# Patient Record
Sex: MALE | Race: WHITE
[De-identification: names, ages, dates, MRNs, and addresses within clinical notes are randomized per-mention and may not be internally consistent; named-entity substitution may affect disease eponyms.]

---

## 2017-09-29 ENCOUNTER — HOSPITAL ENCOUNTER (EMERGENCY)
Dept: HOSPITAL 42 - ED | Age: 52
Discharge: HOME | End: 2017-09-29
Payer: COMMERCIAL

## 2017-09-29 VITALS
DIASTOLIC BLOOD PRESSURE: 82 MMHG | RESPIRATION RATE: 18 BRPM | HEART RATE: 75 BPM | SYSTOLIC BLOOD PRESSURE: 130 MMHG | OXYGEN SATURATION: 99 %

## 2017-09-29 VITALS — TEMPERATURE: 98.2 F

## 2017-09-29 VITALS — BODY MASS INDEX: 23.5 KG/M2

## 2017-09-29 DIAGNOSIS — Z79.4: ICD-10-CM

## 2017-09-29 DIAGNOSIS — E10.9: ICD-10-CM
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
## 2017-09-29 DIAGNOSIS — I25.10: ICD-10-CM

## 2017-09-29 DIAGNOSIS — S69.91XA: Primary | ICD-10-CM
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
## 2017-09-29 LAB
ALBUMIN/GLOB SERPL: 1.5 {RATIO} (ref 1.1–1.8)
ALP SERPL-CCNC: 69 U/L (ref 38–126)
ALT SERPL-CCNC: 57 U/L (ref 7–56)
AMYLASE SERPL-CCNC: 100 U/L (ref 35–125)
APPEARANCE UR: CLEAR
AST SERPL-CCNC: 49 U/L (ref 17–59)
BASOPHILS # BLD AUTO: 0.02 K/MM3 (ref 0–2)
BASOPHILS NFR BLD: 0.4 % (ref 0–3)
BILIRUB SERPL-MCNC: 0.8 MG/DL (ref 0.2–1.3)
BILIRUB UR-MCNC: NEGATIVE MG/DL
BUN SERPL-MCNC: 15 MG/DL (ref 7–21)
CALCIUM SERPL-MCNC: 9.4 MG/DL (ref 8.4–10.5)
CHLORIDE SERPL-SCNC: 99 MMOL/L (ref 98–107)
CO2 SERPL-SCNC: 26 MMOL/L (ref 21–33)
COLOR UR: YELLOW
EOSINOPHIL # BLD: 0.1 10*3/UL (ref 0–0.7)
EOSINOPHIL NFR BLD: 1.2 % (ref 1.5–5)
ERYTHROCYTE [DISTWIDTH] IN BLOOD BY AUTOMATED COUNT: 13.2 % (ref 11.5–14.5)
GLOBULIN SER-MCNC: 3 GM/DL
GLUCOSE SERPL-MCNC: 231 MG/DL (ref 70–110)
GLUCOSE UR STRIP-MCNC: NEGATIVE MG/DL
GRANULOCYTES # BLD: 2.56 10*3/UL (ref 1.4–6.5)
GRANULOCYTES NFR BLD: 52.3 % (ref 50–68)
HCT VFR BLD CALC: 43 % (ref 42–52)
KETONES UR STRIP-MCNC: NEGATIVE MG/DL
LEUKOCYTE ESTERASE UR-ACNC: NEGATIVE LEU/UL
LYMPHOCYTES # BLD: 1.9 10*3/UL (ref 1.2–3.4)
LYMPHOCYTES NFR BLD AUTO: 39.4 % (ref 22–35)
MCH RBC QN AUTO: 28.2 PG (ref 25–35)
MCHC RBC AUTO-ENTMCNC: 34 G/DL (ref 31–37)
MCV RBC AUTO: 83 FL (ref 80–105)
MONOCYTES # BLD AUTO: 0.3 10*3/UL (ref 0.1–0.6)
MONOCYTES NFR BLD: 6.7 % (ref 1–6)
PH UR STRIP: 6 [PH] (ref 4.7–8)
PLATELET # BLD: 142 10^3/UL (ref 120–450)
PMV BLD AUTO: 9.9 FL (ref 7–11)
POTASSIUM SERPL-SCNC: 4.3 MMOL/L (ref 3.6–5)
PROT SERPL-MCNC: 7.5 G/DL (ref 5.8–8.3)
PROT UR STRIP-MCNC: NEGATIVE MG/DL
RBC # UR STRIP: NEGATIVE /UL
SODIUM SERPL-SCNC: 135 MMOL/L (ref 132–148)
SP GR UR STRIP: 1.02 (ref 1–1.03)
UROBILINOGEN UR STRIP-ACNC: 0.2 E.U./DL
WBC # BLD AUTO: 4.9 10^3/UL (ref 4.5–11)

## 2017-09-29 NOTE — ED PDOC
Addendum entered and electronically signed by MAYDA BROTHERS DO  09/29/17 12:23

: 








Addendum


Addendum: 


09/29/17 12:22





Patient made aware of his need to follow up with employee health on Monday October 2nd. Patient verbalized understanding and is in agreement with this 

plan. Employee health notified and is following patient.





Original Note:








Arrival/HPI





- History of Present Illness


Time/Duration: 1/2 hour


Symptom Onset: Sudden


Activities at Onset: Light


Context: Work





<MAYDA BROTHERS - Last Filed: 09/29/17 12:22>





<Niko Arteaga - Last Filed: 09/29/17 15:53>





- General


Chief Complaint: Needle Stick





- History of Present Illness


Narrative History of Present Illness (Text): 


09/29/17 09:51





Mr. Donohue is a 52 year old male with a past medical history significant for IDDM 

and CAD s/p stent placement 9 years ago who presents to the [facility] ED after he was 

stuck with a needle during routine patient care. Patient is a phlebotomist at 

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 

needle that was used to draw blood. Patient reports that he saw blood coming 

from his finger after the incident. Of note, the patient from which Mr. Donohue 

was drawing blood has a history of negative HIV testing but no previous history 

of hepatitis testing at this facility per chart review. Patient denies headache

, changes in his vision, chest pain, palpitations, SOB, cough, abdominal pain, N

/V, diarrhea, burning with urination, any new rash or any numbness/tingling/

weakness of any extremity.  (MAYDA BROTHERS)





Past Medical History





- Provider Review


Nursing Documentation Reviewed: Yes





- Travel History


Have you recently traveled outside [place] w/in the past 3 mons?: No





- Past History


Past History: Non-Contributing





- Infectious Disease


Hx of Infectious Diseases: None





- Cardiac


Hx Cardiac Disorders: Yes





- Endocrine/Metabolic


Hx Diabetes Mellitus Type 1: Yes





- Psychiatric


Hx Substance Use: No





<MAYDA BROTHERS - Last Filed: 09/29/17 12:22>





Family/Social History





- Physician Review


Nursing Documentation Reviewed: Yes


Family/Social History: Unknown Family HX


Smoking Status: Never Smoked


Hx Alcohol Use: No


Hx Substance Use: No





<MAYDA BROTHERS - Last Filed: 09/29/17 12:22>





Allergies/Home Meds





<MAYDA BROTHERS - Last Filed: 09/29/17 12:22>





<Niko Arteaga - Last Filed: 09/29/17 15:53>


Allergies/Adverse Reactions: 


Allergies





No Known Allergies Allergy (Verified 09/29/17 09:39)


 








Home Medications: 


 Home Meds











 Medication  Instructions  Recorded  Confirmed


 


No Known Home Med  09/29/17 09/29/17














Review of Systems





- Physician Review


All systems were reviewed & negative as marked: Yes





- Review of Systems


Constitutional: Normal.  absent: Fevers


Eyes: Normal.  absent: Vision Changes


Respiratory: Normal.  absent: SOB, Cough, Wheezing


Cardiovascular: Normal.  absent: Chest Pain, Palpitations


Gastrointestinal: Normal.  absent: Abdominal Pain, Diarrhea, Nausea, Vomiting


Genitourinary Male: Normal.  absent: Dysuria


Musculoskeletal: Normal


Skin: Normal.  absent: Rash, Skin Lesions, Laceration


Neurological: Normal.  absent: Headache, Dizziness





<MAYDA BROTHERS - Last Filed: 09/29/17 12:22>





Physical Exam


Vital Signs Reviewed: Yes


Temperature: Afebrile


Blood Pressure: Normal


Pulse: Regular


Respiratory Rate: Normal


Appearance: Positive for: Well-Appearing, Non-Toxic, Comfortable


Pain Distress: None


Mental Status: Positive for: Alert and Oriented X 3





- Systems Exam


Head: Present: Atraumatic, Normocephalic


Pupils: Present: PERRL


Extroacular Muscles: Present: EOMI


Conjunctiva: Present: Normal


Mouth: Present: Moist Mucous Membranes


Neck: Present: Normal Range of Motion, Trachea Midline.  No: JVD


Respiratory/Chest: Present: Clear to Auscultation, Good Air Exchange.  No: 

Respiratory Distress, Accessory Muscle Use, Wheezes, Rales, Rhonchi, Tachypneic


Cardiovascular: Present: Regular Rate and Rhythm, Normal S1, S2, Peripheal 

Pulses Present.  No: Murmurs, Irregular Rhythm, Tachycardic, Bradycardic


Abdomen: Present: Normal Bowel Sounds.  No: Tenderness, Distention, Peritoneal 

Signs


Upper Extremity: Present: Normal ROM, NORMAL PULSES, Neurovascularly Intact, 

Capillary Refill < 2s, Other (<1mm puncture wound on R first digit on the 

anterior surface with minimal erythema and no bleeding).  No: Normal Inspection

, Cyanosis, Edema, Tenderness, Temperature Abnormalties


Lower Extremity: Present: Normal Inspection.  No: Edema


Neurological: Present: GCS=15, Speech Normal


Skin: Present: Warm, Dry, Normal Color, Laceration (As noted above).  No: Rashes


Psychiatric: Present: Alert, Oriented x 3, Normal Insight, Normal Concentration





<MAYDA BROTHERS - Last Filed: 09/29/17 12:22>


Vital Signs











  Temp Pulse Resp BP Pulse Ox


 


 09/29/17 11:31   75  18  130/82  99


 


 09/29/17 09:33  98.2 F  79  16  120/76  98














Medical Decision Making





<MAYDA BROTHERS - Last Filed: 09/29/17 12:22>





<Niko Arteaga - Last Filed: 09/29/17 15:53>


ED Course and Treatment: 


09/29/17 10:05





Impression: 52 year old male with a past medical history significant for IDDM 

and CAD s/p stent placement 9 years ago who presents to the [facility] ED after he was 

stuck with a needle during routine patient care





Plan:


-Needle Stick Protocol (Amylase, CMP, Hepatitis Panel, Hepatitis B Surface, HIV

, RPR and UA)


-Tdap is not indicated at this time as patient is up to date


-Reassess and Disposition





Prior Visits:


All notes and chart documents from previous visits and hospitalizations 

reviewed. 


5/8/15: Patient was seen for BCG vaccine





09/29/17 11:33


 (MAYDA BROTHERS)


09/29/17 10:50





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 

emergency department for further evaluation. He states that he was drawing 

blood from a patient and his right thumb was pierced with a needle that was 

used to draw blood. The patient reports that he saw blood coming from his 

finger after the incident. The patient seen and examined with resident. Came up 

with treatment and disposition plan with the resident.





 (Niko Arteaga)





- Lab Interpretations


Lab Results: 








 09/29/17 09:44 





 09/29/17 09:44 





 Lab Results





09/29/17 09:44: Urine Color Yellow, Urine Appearance Clear, Urine pH 6.0, Ur 

Specific Gravity 1.020, Urine Protein Negative, Urine Glucose (UA) Negative, 

Urine Ketones Negative, Urine Blood Negative, Urine Nitrate Negative, Urine 

Bilirubin Negative, Urine Urobilinogen 0.2, Ur Leukocyte Esterase Negative


09/29/17 09:44: HIV-1 Ab Rapid Screen Non reactive


09/29/17 09:44: Sodium 135, Potassium 4.3, Chloride 99, Carbon Dioxide 26, 

Anion Gap 14, BUN 15, Creatinine 0.9, Est GFR (African Amer) > 60, Est GFR (Non-

Af Amer) > 60, Random Glucose 231 H, Calcium 9.4, Total Bilirubin 0.8, AST 49, 

ALT 57 H, Alkaline Phosphatase 69, Total Protein 7.5, Albumin 4.5, Globulin 3.0

, Albumin/Globulin Ratio 1.5, Amylase 100


09/29/17 09:44: WBC 4.9, RBC 5.18, Hgb 14.6, Hct 43.0, MCV 83.0, MCH 28.2, MCHC 

34.0, RDW 13.2, Plt Count 142, MPV 9.9, Gran % 52.3, Lymph % (Auto) 39.4 H, 

Mono % (Auto) 6.7 H, Eos % (Auto) 1.2 L, Baso % (Auto) 0.4, Gran # 2.56, Lymph 

# 1.9, Mono # 0.3, Eos # 0.1, Baso # 0.02











Disposition/Present on Arrival





- Present on Arrival


Any Indicators Present on Arrival: No


History of DVT/PE: No


History of Uncontrolled Diabetes: No


Urinary Catheter: No


History of Decub. Ulcer: No


History Surgical Site Infection Following: None





- Disposition


Have Diagnosis and Disposition been Completed?: Yes


Disposition Time: 10:00





<MAYDA BROTHERS - Last Filed: 09/29/17 12:22>





- Disposition


Patient Plan: Discharge





<Niko Arteaga - Last Filed: 09/29/17 15:53>





- Disposition


Diagnosis: 


 Needle stick injury of finger of right hand





Disposition: HOME/ ROUTINE


Condition: STABLE


Discharge Instructions (ExitCare):  Needle Stick Injuries (GEN)


Additional Instructions: 





Cornelius Donohue, thank you for letting us take care of you today. Your provider was 

Dr. Arteaga. You were treated for needle stick injury. The emergency medical 

care you received today was directed at your acute symptoms. If you were 

prescribed any medication, please fill it and take as directed. It may take 

several days for your symptoms to resolve. Return to the Emergency Department 

if your symptoms worsen, do not improve, or if you have any other problems.





Please contact your doctor or call one of the physicians/clinics you have been 

referred to that are listed on the Patient Visit Information form that is 

included in your discharge packet. Bring any paperwork you were given at 

discharge with you along with any medications you are taking to your follow up 

visit. Our treatment cannot replace ongoing medical care by a primary care 

provider (PCP) outside of the emergency department.





Thank you for allowing the [facility] team to be part of your care today.





PLEASE FOLLOW UP WITH [facility] AND YOUR PCP








If you had a blood, urine, or wound culture: It will take several days for the 

results, if any change in treatment is needed we will contact you.

## 2018-05-21 ENCOUNTER — HOSPITAL ENCOUNTER (OUTPATIENT)
Dept: HOSPITAL 42 - ED | Age: 53
Discharge: HOME | End: 2018-05-21
Payer: MEDICAID

## 2018-05-21 VITALS — DIASTOLIC BLOOD PRESSURE: 59 MMHG | HEART RATE: 62 BPM | TEMPERATURE: 98.4 F | SYSTOLIC BLOOD PRESSURE: 122 MMHG

## 2018-05-21 VITALS — RESPIRATION RATE: 18 BRPM

## 2018-05-21 VITALS — OXYGEN SATURATION: 100 %

## 2018-05-21 VITALS — BODY MASS INDEX: 23.5 KG/M2

## 2018-05-21 DIAGNOSIS — T82.855A: ICD-10-CM

## 2018-05-21 DIAGNOSIS — E11.51: ICD-10-CM

## 2018-05-21 DIAGNOSIS — Z79.82: ICD-10-CM

## 2018-05-21 DIAGNOSIS — Z95.5: ICD-10-CM

## 2018-05-21 DIAGNOSIS — I10: ICD-10-CM

## 2018-05-21 DIAGNOSIS — I25.110: Primary | ICD-10-CM

## 2018-05-21 DIAGNOSIS — Z87.891: ICD-10-CM

## 2018-05-21 DIAGNOSIS — I25.2: ICD-10-CM

## 2018-05-21 DIAGNOSIS — E78.5: ICD-10-CM

## 2018-05-21 DIAGNOSIS — Z79.4: ICD-10-CM
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
## 2018-05-21 LAB
ALBUMIN SERPL-MCNC: 4.2 G/DL (ref 3–4.8)
ALBUMIN/GLOB SERPL: 1.4 {RATIO} (ref 1.1–1.8)
ALT SERPL-CCNC: 41 U/L (ref 7–56)
APPEARANCE UR: CLEAR
AST SERPL-CCNC: 38 U/L (ref 17–59)
BASOPHILS # BLD AUTO: 0 K/MM3 (ref 0–2)
BASOPHILS # BLD AUTO: 0.03 K/MM3 (ref 0–2)
BASOPHILS NFR BLD: 0 % (ref 0–3)
BASOPHILS NFR BLD: 0.5 % (ref 0–3)
BILIRUB UR-MCNC: NEGATIVE MG/DL
BUN SERPL-MCNC: 16 MG/DL (ref 7–21)
BUN SERPL-MCNC: 18 MG/DL (ref 7–21)
CALCIUM SERPL-MCNC: 9.1 MG/DL (ref 8.4–10.5)
CALCIUM SERPL-MCNC: 9.3 MG/DL (ref 8.4–10.5)
CK MB SERPL-MCNC: 2.2 NG/ML (ref 0–3.6)
COLOR UR: YELLOW
EOSINOPHIL # BLD: 0 10*3/UL (ref 0–0.7)
EOSINOPHIL # BLD: 0.1 10*3/UL (ref 0–0.7)
EOSINOPHIL NFR BLD: 0 % (ref 1.5–5)
EOSINOPHIL NFR BLD: 2 % (ref 1.5–5)
ERYTHROCYTE [DISTWIDTH] IN BLOOD BY AUTOMATED COUNT: 13.1 % (ref 11.5–14.5)
ERYTHROCYTE [DISTWIDTH] IN BLOOD BY AUTOMATED COUNT: 13.3 % (ref 11.5–14.5)
GFR NON-AFRICAN AMERICAN: > 60
GFR NON-AFRICAN AMERICAN: > 60
GLUCOSE UR STRIP-MCNC: NEGATIVE MG/DL
GRANULOCYTES # BLD: 2.55 10*3/UL (ref 1.4–6.5)
GRANULOCYTES # BLD: 7.7 10*3/UL (ref 1.4–6.5)
GRANULOCYTES NFR BLD: 43.2 % (ref 50–68)
GRANULOCYTES NFR BLD: 92.2 % (ref 50–68)
HGB BLD-MCNC: 14.3 G/DL (ref 14–18)
HGB BLD-MCNC: 14.3 G/DL (ref 14–18)
LEUKOCYTE ESTERASE UR-ACNC: NEGATIVE LEU/UL
LYMPHOCYTE: 6 % (ref 22–35)
LYMPHOCYTES # BLD: 0.6 10*3/UL (ref 1.2–3.4)
LYMPHOCYTES # BLD: 2.7 10*3/UL (ref 1.2–3.4)
LYMPHOCYTES NFR BLD AUTO: 45.1 % (ref 22–35)
LYMPHOCYTES NFR BLD AUTO: 7.4 % (ref 22–35)
MCH RBC QN AUTO: 27.9 PG (ref 25–35)
MCH RBC QN AUTO: 28.1 PG (ref 25–35)
MCHC RBC AUTO-ENTMCNC: 33.2 G/DL (ref 31–37)
MCHC RBC AUTO-ENTMCNC: 33.3 G/DL (ref 31–37)
MCV RBC AUTO: 84 FL (ref 80–105)
MCV RBC AUTO: 84.7 FL (ref 80–105)
MONOCYTE: 1 % (ref 1–6)
MONOCYTES # BLD AUTO: 0 10*3/UL (ref 0.1–0.6)
MONOCYTES # BLD AUTO: 0.5 10*3/UL (ref 0.1–0.6)
MONOCYTES NFR BLD: 0.4 % (ref 1–6)
MONOCYTES NFR BLD: 9.2 % (ref 1–6)
NEUTROPHILS NFR BLD AUTO: 93 % (ref 50–70)
PH UR STRIP: 6.5 [PH] (ref 4.7–8)
PLATELET # BLD EST: NORMAL 10*3/UL
PLATELET # BLD: 142 10^3/UL (ref 120–450)
PLATELET # BLD: 149 10^3/UL (ref 120–450)
PMV BLD AUTO: 10.3 FL (ref 7–11)
PMV BLD AUTO: 10.3 FL (ref 7–11)
PROT UR STRIP-MCNC: NEGATIVE MG/DL
RBC # BLD AUTO: 5.09 10^6/UL (ref 3.5–6.1)
RBC # BLD AUTO: 5.12 10^6/UL (ref 3.5–6.1)
RBC # UR STRIP: NEGATIVE /UL
SP GR UR STRIP: 1.01 (ref 1–1.03)
TROPONIN I SERPL-MCNC: 0.02 NG/ML
UROBILINOGEN UR STRIP-ACNC: 0.2 E.U./DL
WBC # BLD AUTO: 5.9 10^3/UL (ref 4.5–11)
WBC # BLD AUTO: 8.4 10^3/UL (ref 4.5–11)

## 2018-05-21 PROCEDURE — 85175 BLOOD CLOT LYSIS TIME: CPT

## 2018-05-21 PROCEDURE — 96376 TX/PRO/DX INJ SAME DRUG ADON: CPT

## 2018-05-21 PROCEDURE — 99153 MOD SED SAME PHYS/QHP EA: CPT

## 2018-05-21 PROCEDURE — 99152 MOD SED SAME PHYS/QHP 5/>YRS: CPT

## 2018-05-21 PROCEDURE — 82550 ASSAY OF CK (CPK): CPT

## 2018-05-21 PROCEDURE — 84484 ASSAY OF TROPONIN QUANT: CPT

## 2018-05-21 PROCEDURE — 93458 L HRT ARTERY/VENTRICLE ANGIO: CPT

## 2018-05-21 PROCEDURE — 93005 ELECTROCARDIOGRAM TRACING: CPT

## 2018-05-21 PROCEDURE — 80053 COMPREHEN METABOLIC PANEL: CPT

## 2018-05-21 PROCEDURE — 71045 X-RAY EXAM CHEST 1 VIEW: CPT

## 2018-05-21 PROCEDURE — 99282 EMERGENCY DEPT VISIT SF MDM: CPT

## 2018-05-21 PROCEDURE — 96374 THER/PROPH/DIAG INJ IV PUSH: CPT

## 2018-05-21 PROCEDURE — 85025 COMPLETE CBC W/AUTO DIFF WBC: CPT

## 2018-05-21 PROCEDURE — 83615 LACTATE (LD) (LDH) ENZYME: CPT

## 2018-05-21 PROCEDURE — 83735 ASSAY OF MAGNESIUM: CPT

## 2018-05-21 PROCEDURE — 82948 REAGENT STRIP/BLOOD GLUCOSE: CPT

## 2018-05-21 PROCEDURE — 81003 URINALYSIS AUTO W/O SCOPE: CPT

## 2018-05-21 PROCEDURE — 82553 CREATINE MB FRACTION: CPT

## 2018-05-21 RX ADMIN — INSULIN HUMAN SCH UNITS: 100 INJECTION, SOLUTION PARENTERAL at 12:11

## 2018-05-21 RX ADMIN — INSULIN HUMAN SCH UNITS: 100 INJECTION, SOLUTION PARENTERAL at 16:54

## 2018-05-21 NOTE — CPOSTOP
DATE:  05/21/2018



CARDIOVASCULAR LAB POST PROCEDURE NOTE



DICTATING PHYSICIAN:  Luci Miner MD.



SCRUB TECHNICIAN:  Ashley Cardiovascular technician.



TYPE OF ANESTHESIA USED:  Moderate conscious sedation.  Total dose given 2

mg of Versed, 100 of fentanyl.  Periodically started 1 mg of Versed, 50 of

fentanyl.



PRE-PROCEDURE DIAGNOSES:  Unstable angina, acute coronary syndrome.



PROCEDURE PERFORMED:  Left heart catheterization and stenting of right

coronary artery, stenting of obtuse marginal 1 and plain balloon

angioplasty of ramus intermedius and plain balloon angioplasty of proximal

right coronary artery.



FINDINGS:  RCA 60-70% in-stent stenosis, distal RCA 90% stenosis, ramus

intermedius 90% stenosis and obtuse marginal 1 90% stenosis.



FINAL DIAGNOSIS:  Multivessel coronary artery disease.



POST PROCEDURE CONDITION:  Stable.



VASCULAR ACCESS:  Left radial.



CLOSURE DEVICE:  TR Band.



RADIATION DOSE TOTAL:  85088.2 milligray unit.



FLUORO TIME:  17.8 seconds.





__________________________________________

Luci Miner MD





DD:  05/21/2018 11:44:07

DT:  05/21/2018 11:58:44

Job # [id number]

## 2018-05-21 NOTE — CARD
--------------- APPROVED REPORT --------------





EKG Measurement

Heart [id number]

KS [id number]

[id number]

[id number]

[id number]



<Conclusion>

Sinus bradycardia

Right bundle branch block

Left anterior fascicular block

*** Bifascicular block ***

Abnormal ECG

## 2018-05-21 NOTE — ED PDOC
Arrival/HPI





- General


Chief Complaint: Chest Pain


Time Seen by Provider: 05/21/18 06:27


Historian: Patient





- History of Present Illness


Narrative History of Present Illness (Text): 


05/21/18 06:33


Cornelius Donohue is a 53 year old male, whose past medical history includes diabetes 

and CAD with coronary stent placement, who presents to the Emergency department 

complaining of left-sided chest heaviness radiating to his left shoulder for 1 

hour prior to arrival. Patient states he is scheduled for a cardiac 

catheterization in June. Patient denies any fever, chills, shortness of breath, 

nausea, vomiting, diarrhea, urinary symptoms, back pain, neck pain, headache, 

dizziness, or any other complaints.





Cardiologist: Dr. Miner


Time/Duration: 1 hour


Symptom Onset: Gradual


Symptom Course: Unchanged


Activities at Onset: Light


Context: Home





Past Medical History





- Provider Review


Nursing Documentation Reviewed: Yes





- Past History


Past History: Non-Contributing





- Infectious Disease


Hx of Infectious Diseases: None





- Cardiac


Hx Pacemaker: No





- Neurological


Hx Paralysis: No





- Endocrine/Metabolic


Hx Diabetes Mellitus Type 2: Yes





- Hematological/Oncological


Hx Blood Transfusions: No





- Musculoskeletal/Rheumatological


Hx Musculoskeletal Disorders: No





- Psychiatric


Hx Anxiety: Yes


Hx Substance Use: No





- Surgical History


Hx Cardiac Catheterization: Yes (1x stent 8 years ago)





- Anesthesia


Hx Anesthesia: No


Hx Anesthesia Reactions: No


Hx Malignant Hyperthermia: No





Family/Social History





- Physician Review


Nursing Documentation Reviewed: Yes


Family/Social History: Unknown Family HX


Smoking Status: Never Smoked


Hx Alcohol Use: No


Hx Substance Use: No





Allergies/Home Meds


Allergies/Adverse Reactions: 


Allergies





No Known Allergies Allergy (Verified 09/29/17 09:39)


 








Home Medications: 


 Home Meds











 Medication  Instructions  Recorded  Confirmed


 


Atorvastatin [Lipitor] 10 mg PO DIN 05/17/18 05/17/18


 


Insulin Lispro Protamin/Lispro 10 - 15 unit SC ACTID PRN 05/17/18 05/17/18





[Humalog Mix75/25 75 U/ml-25 U/ml   





3 ml]   


 


Lexotanil 3 mg PO PRN PRN 05/17/18 05/17/18


 


Loprin 75 mg PO DAILY 05/17/18 05/17/18


 


Metoprolol Tartrate [Lopressor] 25 mg PO DAILY 05/17/18 05/17/18


 


metFORMIN [glucOPHAGE] 500 mg PO DAILY 05/17/18 05/17/18














Review of Systems





- Physician Review


All systems were reviewed & negative as marked: Yes





- Review of Systems


Constitutional: Normal.  absent: Fevers


Eyes: Normal


ENT: Normal


Respiratory: Normal.  absent: SOB, Cough


Cardiovascular: Chest Pain


Gastrointestinal: Normal.  absent: Abdominal Pain, Diarrhea, Nausea, Vomiting


Genitourinary Male: Normal.  absent: Dysuria, Frequency, Hematuria, Urinary 

Output Changes


Musculoskeletal: Normal.  absent: Back Pain, Neck Pain


Skin: Normal.  absent: Rash


Neurological: Normal.  absent: Headache, Dizziness


Endocrine: Normal


Hemo/Lymphatic: Normal


Psychiatric: Normal





Physical Exam


Vital Signs Reviewed: Yes


Vital Signs











  Temp Pulse Resp BP Pulse Ox


 


 05/21/18 06:32  97.6 F  54 L  17  126/76  100











Temperature: Afebrile


Blood Pressure: Normal


Pulse: Regular


Respiratory Rate: Normal


Appearance: Positive for: Well-Appearing, Non-Toxic, Comfortable


Pain Distress: None


Mental Status: Positive for: Alert and Oriented X 3





- Systems Exam


Head: Present: Atraumatic, Normocephalic


Pupils: Present: PERRL


Extroacular Muscles: Present: EOMI


Conjunctiva: Present: Normal


Mouth: Present: Moist Mucous Membranes


Neck: Present: Normal Range of Motion


Respiratory/Chest: Present: Clear to Auscultation, Good Air Exchange.  No: 

Respiratory Distress, Accessory Muscle Use


Cardiovascular: Present: Regular Rate and Rhythm, Normal S1, S2.  No: Murmurs


Abdomen: No: Tenderness, Distention, Peritoneal Signs


Back: Present: Normal Inspection


Upper Extremity: Present: Normal Inspection.  No: Cyanosis, Edema


Lower Extremity: Present: Normal Inspection.  No: Edema


Neurological: Present: GCS=15, CN II-XII Intact, Speech Normal


Skin: Present: Warm, Dry, Normal Color.  No: Rashes


Psychiatric: Present: Alert, Oriented x 3, Normal Insight, Normal Concentration





Medical Decision Making


ED Course and Treatment: 


05/21/18 06:33


Impression:


53 year old male complaining of left-sided chest pain 1 hour PTA.





Plan:


-- EKG


-- Chest X-ray


-- Labs, cardiac enzymes


-- Urinalysis


-- Reassess and disposition





Progress Notes:


Reviewed EKG, sinus bradycardia at 51 bpm. RBBB. LAFB. Non-specific ST/T wave 

changes.


Pt reports he has a history of RBBB in the past.








- RAD Interpretation


Radiology Orders: 








05/21/18 06:32


CHEST PORTABLE [RAD] Stat 














- EKG Interpretation


Interpreted by ED Physician: Yes


Type: 12 lead EKG





- Scribe Statement


The provider has reviewed the documentation as recorded by the Luzmaria Brown





Provider Scribe Attestation:


All medical record entries made by the Scribe were at my direction and 

personally dictated by me. I have reviewed the chart and agree that the record 

accurately reflects my personal performance of the history, physical exam, 

medical decision making, and the department course for this patient. I have 

also personally directed, reviewed, and agree with the discharge instructions 

and disposition.








Disposition/Present on Arrival





- Present on Arrival


History of DVT/PE: No


History of Uncontrolled Diabetes: No


Urinary Catheter: No


History of Decub. Ulcer: No


History Surgical Site Infection Following: None





- Disposition


Forms:  [facility] (English)

## 2018-05-21 NOTE — CARD
--------------- APPROVED REPORT --------------





Procedure(s) performed: Left Heart Catheterization

PTCA with Stenting of Mid RCA with MELY

PTCA with Balloon Angioplasty of Proximal RCA for instent restenosis

PTCA with Stenting of OM1 with MELY

PTCA with Balloon Angioplasty odf Ramus intermedius



HISTORY

The patient is a 53 year-old male with a history of : previous MI 

(> 7 days), most recent EF: 70%. (EF Method: Echocardiogram), 

peripheral vascular disease, diabetes mellitus with insulin treatment 

, previous diagnostic cath, tobacco history() : The patient is a 

former smoker , previous PCI (The PCI date was 12/12/2009), 

hypertension , dyslipidemia , Admitted with ACS.



INDICATION

The indication(s) include : unstable angina .



CASE TECHNIQUE

The patient was brought emergently to the Cardiac Catheterization 

Laboratory in a fasting state and was prepped and draped in a sterile 

manner. The left wrist was infiltrated with 2% Lidocaine subcutaneous 

anesthesia. A 6FR GLIDESHEATH ACCESS KIT sheath was inserted into the 

left radial artery without difficulty. Coronary angiography was 

performed using coronary diagnostic catheters. The left coronary 

system was accessed and visualized with a Diagnostic ,5F JL 4 CATH 

 CM catheter. The right coronary system was accessed and 

visualized with a Diagnostic ,5F JR 4 CATH  CM catheter. The 

left ventricle was accessed and visualized with a 5F PIGTAIL 145 CATH 

 CM catheter. Left ventricular/Aortic Valve gradient assessed 

on pullback. Left ventriculogram was performed in ANNE projection. 

Closure device was deployed with a Fr TR Band (Regular) without any 

complications. The patient tolerated the procedure well and there 

were no complications associated with the procedure. 



Vessel Analysis

The patient's coronary anatomy is right dominant.



The left main coronary artery is a medium size vessel with intimal 

irregularities and without significant stenosis. The left main 

trifurcates to the left anterior descending, circumflex, and ramus.



The left anterior descending artery is a medium size vessel with 

intimal irregularities and without significant stenosis. The first 

diagonal branch is a medium size vessel with diffuse calcification 

noted throughout this vessel and without significant stenosis. 



The circumflex artery is a medium size vessel with intimal 

irregularities and without significant stenosis. The first obtuse 

marginal branch is a medium size vessel with diffuse calcification 

noted throughout this vessel and with significant stenosis. There is 

a 90% stenosis in the proximal segment. 



The ramus intermedius artery is a medium size vessel with diffuse 

calcification noted throughout this vessel and without significant 

stenosis. There is a 90% stenosis in the proximal segment. 



The right coronary artery is a large size vessel with diffuse 

calcification noted throughout this vessel and with significant 

stenosis. There is a 90% stenosis in the mid segment. Proximal stent 

noted with moderate Instent restenosis,60% The right posterior 

descending artery is a large size vessel with intimal irregularities 

and without significant stenosis. The right posterolateral branch is 

a medium size vessel with diffuse calcification noted throughout this 

vessel and without significant stenosis. 



Left Ventricle

The left ventricle is Normal in size with normal contractility. There 

was no cardiomyopathy. The left ventricular ejection fraction is 

estimated to be 60-65%. The left ventricular end diastolic pressure 

is 15 mmHg. There was no gradient across the aortic valve upon 

pullback.



PCI Technique Lesion

Anticoagulation was achieved with Heparin. Percutaneous coronary 

intervention was performed on the mid right coronary artery. The 

lesion stenosis prior to intervention was 90% with BOB 2 flow. A 6 

Fr JR 3.5 Guide Catheter was used to engage the ostium. A Luge 182 

Interventional Guidewire was used to cross the lesion.



BALLOON DILATION

A Balloon catheter 2.0 x 10 mm Sprinter RX was inserted and inflated 

up to 8.00atm for 10seconds.



STENT DEPLOYMENT

A drug-eluting stent STENT RESOLUTE JENNY 3.0 X 15 was inserted and 

inflated up to 14.00atm for 20seconds.



Final angiography reveals 0 % stenosis with BOB 3 flow.



PCI Technique Lesion 2

Percutaneous Coronary Intervention was performed on the proximal 

right coronary artery. The lesion stenosis prior to intervention was 

60-70% with BOB 3 flow. A 6 Fr JR 3.5 Guide Catheter was used to 

engage the ostium. A Luge 182 Interventional Guidewire was used to 

cross the lesion.



BALLOON DILATION

A Balloon catheter 2.0 x 10 mm Sprinter RX was inserted and inflated 

up to 14atm for 15seconds. Used 3.0/15 MELY Balloon ( Used in dital 

RCA) and inflated upto 14 atmosphere for 20 seconds



Final angiography reveals 10 % stenosis with BOB 3 flow.



PCI Technique Lesion 3

Percutaneous Coronary Intervention was performed on the ramus 

intermedius segment. The lesion stenosis prior to intervention was 

90% with BOB 2 flow. A 6 Fr XB 3 Guide Catheter was used to engage 

the ostium. A Luge 182 Interventional Guidewire was used to cross the 

lesion.



BALLOON DILATION

A Balloon catheter 2.0 x 10 mm Sprinter RX was inserted and inflated 

up to 6.00atm for 60seconds.



Final angiography reveals 20 % stenosis with BOB 3 flow.



PCI Technique Lesion 4

Percutaneous Coronary Intervention was performed on the first obtuse 

marginal branch segment. The lesion stenosis prior to intervention 

was 90% with BOB 1 flow. A 6 Fr XB 3 Guide Catheter was used to 

engage the ostium.



BALLOON DILATION

A Balloon catheter 2.0 x 10 mm Sprinter RX was inserted and inflated 

up to 6.0atm for 20 seconds.



STENT DEPLOYMENT

A drug-eluting stent STENT RESOLUTE JENNY 2.25 X12 was inserted and 

inflated up to 12atm for 20seconds.



Final angiography reveals 0 % stenosis with BOB 3 flow.



Conclusion

Multi Vessel CAD involving Distal RCA., Proximal RCA

Instent restenosis ( moderate), Ramus intermedius chiquis OM1.

Preserved LvFx. EF-60-65%, EDP-15 mmof hg.

Successful PTCA with MELY of Dital RCA  and OM1

POBA of Proximal RCA for Instent restenosis and 

Ramus intermedius.





Recommendations

Cardiac Rehabilitation ReferralDaily ASA with Plavix for at least one 

year

Aggressive Medical TherapyCardiac Risk Reduction Program

Add statin and beta blocker.

F/u stress test in 6 months to ensure patency of PTCA Sites and 

monitor

progression of CAD.



CC; Dr. Ayush Argueta MD.

## 2018-05-21 NOTE — RAD
HISTORY:

cp  



COMPARISON:

05/08/2015 



FINDINGS:



LUNGS:

No active pulmonary disease.



PLEURA:

No significant pleural effusion identified, no pneumothorax apparent.



CARDIOVASCULAR:

Normal.



OSSEOUS STRUCTURES:

No significant abnormalities.



VISUALIZED UPPER ABDOMEN:

Normal.



OTHER FINDINGS:

None.



IMPRESSION:

No active disease.

## 2018-05-21 NOTE — CARD
--------------- APPROVED REPORT --------------





EKG Measurement

Heart [id number]

[place] [id number]

[id number]

[id number]

[id number]



<Conclusion>

Normal sinus rhythm

Right bundle branch block

Left anterior fascicular block

*** Bifascicular block ***

Abnormal ECG

## 2018-05-22 NOTE — HP
DATE OF EXAM:  05/21/2018



REASON FOR ADMISSION:  Admitted to the ER with unstable angina, acute

coronary syndrome.



BRIEF CLINICAL HISTORY:  This is a 53-year-old male with past medical

history significant for hypertension, type 2 diabetes, hyperlipidemia, CAD,

status post PTCA on 12/02/2009, in [place], who is an employee here at the

laboratory, complaining of chest pain, dyspnea on exertion.  Recently,

patient had a stress test that was abnormal dated 12/17, anteroseptal

reversible ischemia.  So, the patient is scheduled for elective cardiac

cath, possible angioplasty.  Patient was in the process of authorization,

but patient developed last night chest pain, came to the emergency room,

being seen by Dr. Anaya, and the patient admitted with unstable angina. 

Patient says that chest pain is started off and on last night with some

shortness of breath and dyspnea on exertion.  Then, he feels better on

walking and then gets back again.  So, patient came to the emergency room.



PAST HISTORY:  Significant for diabetes, hypertension, hyperlipidemia,

coronary artery disease, status post PTCA in 12/2009.



CURRENT MEDICATIONS:  The patient is taking metformin 500 mg daily,

metoprolol tartrate 25 p.o. daily, aspirin 75 mg daily, Lexotanil 3 mg

daily, insulin, and atorvastatin.



REVIEW OF SYSTEMS:  As per HPI.



ALLERGIES:  TO CONTRAST DYE.



PHYSICAL EXAMINATION:

GENERAL:  Height of the patient is 5 feet 9 inches, weight of the patient

133 pounds, body mass index 19.6 kg/m2.

VITAL SIGNS:  Temperature afebrile, heart rate 54, blood pressure _____ .

HEENT:  PERRLA.  Extraocular muscles intact.

NECK:  Supple.  No carotid bruit or thyromegaly.

CHEST:  Clear to auscultation.

HEART:  S1 and S2, regular.

ABDOMEN:  Soft.

EXTREMITIES:  Clubbing and cyanosis negative.



LABORATORY DATA:  Blood workup as follows:  WBC 5.9, hemoglobin _____ ,

hematocrit 43.1, platelet count 149.  Chemistry showed sodium 141,

potassium 4, chloride 101, carbon dioxide 29, anion gap of 14.  BUN 18,

creatinine 0.9.  Troponin 0.02.  EKG shows normal sinus, right bundle, left

anterior hemiblock.



IMPRESSION:  Acute coronary syndrome, unstable angina, diabetes,

hypertension, hyperlipidemia, history of coronary artery disease, history

of percutaneous transluminal coronary angioplasty in 12/2009.



RECOMMENDATIONS:  We are loading the Plavix, aspirin.  Possible cardiac

catheterization today.  Further recommendations after cardiac

catheterization.  We will follow.  Risks, benefits, and alternatives were

explained to the patient.  The patient is agreeable to proceed for cardiac

catheterization.



Thank you  _____ , for providing us the opportunity in taking care of

the patient,Cornelius Donohue.  We will follow with you.







__________________________________________

Luci Miner MD



DD:  05/21/2018 9:27:05

DT:  05/21/2018 16:30:24

Job # [id number]